# Patient Record
Sex: FEMALE | URBAN - METROPOLITAN AREA
[De-identification: names, ages, dates, MRNs, and addresses within clinical notes are randomized per-mention and may not be internally consistent; named-entity substitution may affect disease eponyms.]

---

## 2024-04-25 ENCOUNTER — TELEPHONE (OUTPATIENT)
Dept: GASTROENTEROLOGY | Age: 49
End: 2024-04-25

## 2024-04-25 NOTE — TELEPHONE ENCOUNTER
Scheduled patient for direct colon screening with Romana on 05/01/2024        Gideon Mcpherson [facility]  M[facility] COLONOSCOPY PREPARATION INSTRUCTIONS  PRE-ASSESSMENT PRIOR TO PROCEDURE WILL BE BY PHONE     Physician:                          ROMANA                         PROCEDURE DATE: __________05/01/2024________________      Colonoscopy             [address] [id number] [phone]                                                                           PRE-ASSESSMENT WILL CALL PRIOR TO PROCEDURE TO DO A PHONE ASSESSMENT WITH YOU AND ALSO TELL YOU YOUR EXACT ARRIVAL TIME                                                              START CLEAR LIQUID DIET: _______04/30/2024___________________    The day before Colonoscopy NO SOLID FOODS,   CLEAR LIQUIDS ONLY!  Clear liquid diet like   Water, Tea, coffee (no creamer or dairy), white Grape juice, Filtered Apple Juice,  Chicken or Beef Broth, Ginger Ale and Sprite, 7 up, Jell-O, Popsicles (no red or purple),    **NOTE: NO MILK/MILKPRODUCTS, NO RED OR PURPLE COLOR LIQUIDS/DYES**           prep no later than 5 days before your procedure.    MIRALAX PREP:   MiraLAX (238 grams) or generic equivalent.   Dilute the entire 238 grams into 64 oz (1/2 gallon) of clear liquid (Gatorade or Powerade the lemon or Frost flavor)   Start 5:00 pm -12 midnight drink 1 cup every 30 minutes until gone.      Dulcolax Prep:  LAXATIVE TABLETS   Take 2 pills at 5:00 PM and take 2 pills at 10:00 PM.    If you begin to feel nauseated or full during your prep, you may stop drinking for a short time, then resume drinking in smaller amounts.     Remember, it is essential that you complete your entire prep.     NOTHING TO DRINK AFTER